# Patient Record
Sex: FEMALE | Race: WHITE | NOT HISPANIC OR LATINO | Employment: OTHER | URBAN - METROPOLITAN AREA
[De-identification: names, ages, dates, MRNs, and addresses within clinical notes are randomized per-mention and may not be internally consistent; named-entity substitution may affect disease eponyms.]

---

## 2017-09-15 ENCOUNTER — OFFICE VISIT (OUTPATIENT)
Dept: PRIMARY CARE CLINIC | Facility: CLINIC | Age: 82
End: 2017-09-15
Payer: MEDICARE

## 2017-09-15 VITALS
OXYGEN SATURATION: 99 % | BODY MASS INDEX: 24.99 KG/M2 | SYSTOLIC BLOOD PRESSURE: 201 MMHG | WEIGHT: 135.81 LBS | HEART RATE: 81 BPM | DIASTOLIC BLOOD PRESSURE: 75 MMHG | RESPIRATION RATE: 18 BRPM | HEIGHT: 62 IN | TEMPERATURE: 98 F

## 2017-09-15 DIAGNOSIS — E03.9 HYPOTHYROIDISM, UNSPECIFIED TYPE: ICD-10-CM

## 2017-09-15 DIAGNOSIS — Z23 NEED FOR IMMUNIZATION AGAINST INFLUENZA: Primary | ICD-10-CM

## 2017-09-15 DIAGNOSIS — I10 ESSENTIAL HYPERTENSION: ICD-10-CM

## 2017-09-15 DIAGNOSIS — E11.9 TYPE 2 DIABETES MELLITUS WITHOUT COMPLICATION, WITHOUT LONG-TERM CURRENT USE OF INSULIN: ICD-10-CM

## 2017-09-15 DIAGNOSIS — C18.9 MALIGNANT NEOPLASM OF COLON, UNSPECIFIED PART OF COLON: ICD-10-CM

## 2017-09-15 PROCEDURE — 1126F AMNT PAIN NOTED NONE PRSNT: CPT | Mod: S$GLB,,, | Performed by: INTERNAL MEDICINE

## 2017-09-15 PROCEDURE — 99213 OFFICE O/P EST LOW 20 MIN: CPT | Mod: 25,S$GLB,, | Performed by: INTERNAL MEDICINE

## 2017-09-15 PROCEDURE — 90662 IIV NO PRSV INCREASED AG IM: CPT | Mod: S$GLB,,, | Performed by: INTERNAL MEDICINE

## 2017-09-15 PROCEDURE — G0008 ADMIN INFLUENZA VIRUS VAC: HCPCS | Mod: S$GLB,,, | Performed by: INTERNAL MEDICINE

## 2017-09-15 PROCEDURE — 1159F MED LIST DOCD IN RCRD: CPT | Mod: S$GLB,,, | Performed by: INTERNAL MEDICINE

## 2017-09-15 RX ORDER — PRAVASTATIN SODIUM 40 MG/1
40 TABLET ORAL DAILY
COMMUNITY
End: 2018-06-26 | Stop reason: SDUPTHER

## 2017-09-15 RX ORDER — INDAPAMIDE 1.25 MG/1
2.5 TABLET ORAL DAILY
COMMUNITY
End: 2018-06-26 | Stop reason: SDUPTHER

## 2017-09-15 RX ORDER — INSULIN GLARGINE 100 [IU]/ML
20 INJECTION, SOLUTION SUBCUTANEOUS DAILY
COMMUNITY
End: 2017-12-11 | Stop reason: SDUPTHER

## 2017-09-15 RX ORDER — LEVOTHYROXINE SODIUM 50 UG/1
50 TABLET ORAL DAILY
COMMUNITY
End: 2018-07-02 | Stop reason: SDUPTHER

## 2017-09-15 RX ORDER — IRBESARTAN 300 MG/1
300 TABLET ORAL NIGHTLY
COMMUNITY
End: 2018-03-27 | Stop reason: SDUPTHER

## 2017-09-15 NOTE — PROGRESS NOTES
2 patient identifiers confirmed, High dose influenza 0.5ml injection administered to right deltoid, no bleeding present to site, bandaid applied, tolerated well.

## 2017-09-16 RX ORDER — AMLODIPINE BESYLATE 5 MG/1
5 TABLET ORAL DAILY
Qty: 90 TABLET | Refills: 3
Start: 2017-09-16 | End: 2018-06-26 | Stop reason: SDUPTHER

## 2017-09-16 NOTE — PROGRESS NOTES
Subjective:       Patient ID: Jesenia Rodriguez is a 87 y.o. female.    Chief Complaint: Medication Refill    HPI   Pt needs refill meds going to [facility] for f/u colon cancer will have labs there next week will bring reports and flu vaccine  Review of Systems    Objective:      Physical Exam   Constitutional: She is oriented to person, place, and time. She appears well-developed and well-nourished. No distress.   HENT:   Head: Normocephalic and atraumatic.   Right Ear: External ear normal.   Left Ear: External ear normal.   Nose: Nose normal.   Mouth/Throat: Oropharynx is clear and moist. No oropharyngeal exudate.   Eyes: Conjunctivae and EOM are normal. Pupils are equal, round, and reactive to light. Right eye exhibits no discharge. Left eye exhibits no discharge.   Neck: Normal range of motion. Neck supple. No thyromegaly present.   Cardiovascular: Normal rate, regular rhythm, normal heart sounds and intact distal pulses.  Exam reveals no gallop and no friction rub.    No murmur heard.  Pulmonary/Chest: Effort normal and breath sounds normal. No respiratory distress. She has no wheezes. She has no rales. She exhibits no tenderness.   Abdominal: Soft. Bowel sounds are normal. She exhibits no distension. There is no tenderness. There is no rebound and no guarding.   Musculoskeletal: Normal range of motion. She exhibits no edema, tenderness or deformity.   Lymphadenopathy:     She has no cervical adenopathy.   Neurological: She is alert and oriented to person, place, and time.   Skin: Skin is warm and dry. Capillary refill takes less than 2 seconds. No rash noted. No erythema.   Psychiatric: She has a normal mood and affect. Judgment and thought content normal.   Nursing note and vitals reviewed.      Assessment:       1. Need for immunization against influenza        Plan:       Need for immunization against influenza  -     Influenza - High Dose (65+) (PF) (IM)

## 2017-10-04 RX ORDER — ANASTROZOLE 1 MG/1
1 TABLET ORAL DAILY
Qty: 90 TABLET | Refills: 0 | Status: SHIPPED | OUTPATIENT
Start: 2017-10-04 | End: 2018-01-02

## 2017-10-30 ENCOUNTER — CLINICAL SUPPORT (OUTPATIENT)
Dept: PRIMARY CARE CLINIC | Facility: CLINIC | Age: 82
End: 2017-10-30
Payer: MEDICARE

## 2017-10-30 DIAGNOSIS — Z23 IMMUNIZATION DUE: Primary | ICD-10-CM

## 2017-10-30 PROCEDURE — 90736 HZV VACCINE LIVE SUBQ: CPT | Mod: PBBFAC,PN

## 2017-10-30 PROCEDURE — G0009 ADMIN PNEUMOCOCCAL VACCINE: HCPCS | Mod: PBBFAC,PN

## 2017-10-30 PROCEDURE — 99211 OFF/OP EST MAY X REQ PHY/QHP: CPT | Mod: PBBFAC,PN,25

## 2017-10-30 PROCEDURE — 99999 PR PBB SHADOW E&M-EST. PATIENT-LVL I: CPT | Mod: PBBFAC,,,

## 2017-10-30 PROCEDURE — 90732 PPSV23 VACC 2 YRS+ SUBQ/IM: CPT | Mod: PBBFAC,PN

## 2017-12-11 RX ORDER — INSULIN GLARGINE 100 [IU]/ML
20 INJECTION, SOLUTION SUBCUTANEOUS DAILY
Qty: 3 ML | Refills: 5 | Status: SHIPPED | OUTPATIENT
Start: 2017-12-11 | End: 2019-02-11 | Stop reason: SDUPTHER

## 2018-03-28 RX ORDER — IRBESARTAN 300 MG/1
TABLET ORAL
Qty: 90 TABLET | Refills: 0 | Status: SHIPPED | OUTPATIENT
Start: 2018-03-28 | End: 2018-06-26 | Stop reason: SDUPTHER

## 2018-06-26 DIAGNOSIS — I10 ESSENTIAL HYPERTENSION: ICD-10-CM

## 2018-06-26 RX ORDER — INDAPAMIDE 1.25 MG/1
2.5 TABLET ORAL DAILY
Qty: 180 TABLET | Refills: 3 | Status: SHIPPED | OUTPATIENT
Start: 2018-06-26 | End: 2019-05-22 | Stop reason: SDUPTHER

## 2018-06-26 RX ORDER — AMLODIPINE BESYLATE 5 MG/1
5 TABLET ORAL DAILY
Qty: 90 TABLET | Refills: 3 | Status: SHIPPED | OUTPATIENT
Start: 2018-06-26 | End: 2019-05-22 | Stop reason: SDUPTHER

## 2018-06-26 RX ORDER — IRBESARTAN 300 MG/1
300 TABLET ORAL DAILY
Qty: 90 TABLET | Refills: 3 | Status: SHIPPED | OUTPATIENT
Start: 2018-06-26 | End: 2019-05-22 | Stop reason: SDUPTHER

## 2018-06-26 RX ORDER — PRAVASTATIN SODIUM 40 MG/1
40 TABLET ORAL DAILY
Qty: 90 TABLET | Refills: 3 | Status: SHIPPED | OUTPATIENT
Start: 2018-06-26 | End: 2019-05-22 | Stop reason: SDUPTHER

## 2018-07-02 RX ORDER — LEVOTHYROXINE SODIUM 50 UG/1
50 TABLET ORAL DAILY
Qty: 90 TABLET | Refills: 3 | Status: SHIPPED | OUTPATIENT
Start: 2018-07-02 | End: 2019-05-22 | Stop reason: SDUPTHER

## 2018-07-02 NOTE — TELEPHONE ENCOUNTER
----- Message from sIidro Kovacs sent at 7/2/2018  3:11 PM CDT -----  Contact: Jesenia   Calling to get her Rx levothyroxine (SYNTHROID) 50 MCG tablet  She said it was supposed to be sent last week with her others. She is out of her medication at this point    [facility] [place] [facility] - [address] AT [facility] [address]  [address] [address]  Phone: [phone] Fax: [phone]

## 2018-10-16 ENCOUNTER — OFFICE VISIT (OUTPATIENT)
Dept: PRIMARY CARE CLINIC | Facility: CLINIC | Age: 83
End: 2018-10-16
Payer: MEDICARE

## 2018-10-16 VITALS
HEART RATE: 74 BPM | WEIGHT: 141.81 LBS | DIASTOLIC BLOOD PRESSURE: 74 MMHG | HEIGHT: 62 IN | TEMPERATURE: 98 F | SYSTOLIC BLOOD PRESSURE: 155 MMHG | OXYGEN SATURATION: 98 % | BODY MASS INDEX: 26.09 KG/M2 | RESPIRATION RATE: 18 BRPM

## 2018-10-16 DIAGNOSIS — E11.9 TYPE 2 DIABETES MELLITUS WITHOUT COMPLICATION, WITHOUT LONG-TERM CURRENT USE OF INSULIN: Primary | ICD-10-CM

## 2018-10-16 DIAGNOSIS — N18.2 CHRONIC RENAL IMPAIRMENT, STAGE 2 (MILD): ICD-10-CM

## 2018-10-16 DIAGNOSIS — I10 ESSENTIAL HYPERTENSION: ICD-10-CM

## 2018-10-16 DIAGNOSIS — C18.9 MALIGNANT NEOPLASM OF COLON, UNSPECIFIED PART OF COLON: ICD-10-CM

## 2018-10-16 DIAGNOSIS — E03.9 HYPOTHYROIDISM, UNSPECIFIED TYPE: ICD-10-CM

## 2018-10-16 PROCEDURE — 99999 PR PBB SHADOW E&M-EST. PATIENT-LVL III: CPT | Mod: PBBFAC,,, | Performed by: INTERNAL MEDICINE

## 2018-10-16 PROCEDURE — 99213 OFFICE O/P EST LOW 20 MIN: CPT | Mod: S$PBB,,, | Performed by: INTERNAL MEDICINE

## 2018-10-16 PROCEDURE — 81002 URINALYSIS NONAUTO W/O SCOPE: CPT | Mod: PBBFAC,PN | Performed by: INTERNAL MEDICINE

## 2018-10-16 PROCEDURE — 99213 OFFICE O/P EST LOW 20 MIN: CPT | Mod: PBBFAC,PN | Performed by: INTERNAL MEDICINE

## 2018-10-16 RX ORDER — ANASTROZOLE 1 MG/1
1 TABLET ORAL
COMMUNITY
Start: 2018-04-10 | End: 2019-05-22 | Stop reason: SDUPTHER

## 2018-10-16 RX ORDER — VIT C/E/ZN/COPPR/LUTEIN/ZEAXAN 250MG-90MG
1000 CAPSULE ORAL
COMMUNITY
End: 2019-05-22

## 2018-10-16 RX ORDER — PEN NEEDLE, DIABETIC 30 GX3/16"
NEEDLE, DISPOSABLE MISCELLANEOUS
COMMUNITY
Start: 2016-07-29 | End: 2019-05-22 | Stop reason: SDUPTHER

## 2018-10-17 LAB
BILIRUB SERPL-MCNC: NORMAL MG/DL
BLOOD URINE, POC: NORMAL
COLOR, POC UA: YELLOW
GLUCOSE UR QL STRIP: NORMAL
KETONES UR QL STRIP: NORMAL
LEUKOCYTE ESTERASE URINE, POC: NORMAL
NITRITE, POC UA: NORMAL
PH, POC UA: 6
PROTEIN, POC: NORMAL
SPECIFIC GRAVITY, POC UA: 1.01
UROBILINOGEN, POC UA: NORMAL

## 2018-10-17 NOTE — PROGRESS NOTES
Subjective:       Patient ID: Jesenia Rodriguez is a 88 y.o. female.    Chief Complaint: Annual Exam    HPI   patient came back from [facility] for follow-up on her colon cancer was on chemotherapy but discontinue toe patient N/C and her kidney is not normal and need to drink more water base and does not have a copy of any blood tests or any kind of medical record to take back with but clinically if see feel fine no short of breath chest pain no increasing fluid in her legs seems to have good normal urine output  Review of Systems    Objective:      Physical Exam   Constitutional: She is oriented to person, place, and time. She appears well-developed and well-nourished. No distress.   HENT:   Head: Normocephalic and atraumatic.   Right Ear: External ear normal.   Left Ear: External ear normal.   Nose: Nose normal.   Mouth/Throat: Oropharynx is clear and moist. No oropharyngeal exudate.   Eyes: Conjunctivae and EOM are normal. Pupils are equal, round, and reactive to light. Right eye exhibits no discharge. Left eye exhibits no discharge.   Neck: Normal range of motion. Neck supple. No thyromegaly present.   Cardiovascular: Normal rate, regular rhythm, normal heart sounds and intact distal pulses. Exam reveals no gallop and no friction rub.   No murmur heard.  Pulmonary/Chest: Effort normal and breath sounds normal. No respiratory distress. She has no wheezes. She has no rales. She exhibits no tenderness.   Abdominal: Soft. Bowel sounds are normal. She exhibits no distension. There is no tenderness. There is no rebound and no guarding.   Musculoskeletal: Normal range of motion. She exhibits no edema, tenderness or deformity.   Lymphadenopathy:     She has no cervical adenopathy.   Neurological: She is alert and oriented to person, place, and time.   Skin: Skin is warm and dry. Capillary refill takes less than 2 seconds. No rash noted. No erythema.   Psychiatric: She has a normal mood and affect. Judgment and thought  content normal.   Nursing note and vitals reviewed.      Assessment:       1. Type 2 diabetes mellitus without complication, without long-term current use of insulin    2. Malignant neoplasm of colon, unspecified part of colon    3. Essential hypertension    4. Hypothyroidism, unspecified type    5. Chronic renal impairment, stage 2 (mild)        Plan:       Type 2 diabetes mellitus without complication, without long-term current use of insulin  -     Microalbumin/creatinine urine ratio; Future; Expected date: 10/16/2018  -     Hemoglobin A1c; Future; Expected date: 10/16/2018    Malignant neoplasm of colon, unspecified part of colon  Comments:  f/u with [facility]    Essential hypertension  -     CBC auto differential; Future; Expected date: 10/16/2018  -     Comprehensive metabolic panel; Future; Expected date: 10/16/2018  -     Lipid panel; Future; Expected date: 10/16/2018  -     POCT URINE DIPSTICK WITHOUT MICROSCOPE    Hypothyroidism, unspecified type  -     TSH; Future; Expected date: 10/16/2018  -     T4, free; Future; Expected date: 10/16/2018    Chronic renal impairment, stage 2 (mild)  -     Comprehensive metabolic panel; Future; Expected date: 10/16/2018

## 2018-10-22 ENCOUNTER — TELEPHONE (OUTPATIENT)
Dept: PRIMARY CARE CLINIC | Facility: CLINIC | Age: 83
End: 2018-10-22

## 2018-10-22 NOTE — TELEPHONE ENCOUNTER
----- Message from Ella Sidhu sent at 10/22/2018  2:52 PM CDT -----  Contact: Self  Type:  Test Results    Who Called:  Patient  Name of Test (Lab/Mammo/Etc):  lab  Date of Test:  1017  Ordering Provider: Dr Dallas  Where the test was performed:  [facility]  Best Call Back Number:  [phone]   Additional Information: na

## 2019-02-04 ENCOUNTER — OFFICE VISIT (OUTPATIENT)
Dept: PRIMARY CARE CLINIC | Facility: CLINIC | Age: 84
End: 2019-02-04
Payer: MEDICARE

## 2019-02-04 VITALS
HEART RATE: 85 BPM | SYSTOLIC BLOOD PRESSURE: 134 MMHG | WEIGHT: 138.13 LBS | RESPIRATION RATE: 18 BRPM | OXYGEN SATURATION: 98 % | HEIGHT: 62 IN | DIASTOLIC BLOOD PRESSURE: 75 MMHG | BODY MASS INDEX: 25.42 KG/M2 | TEMPERATURE: 98 F

## 2019-02-04 DIAGNOSIS — E03.9 HYPOTHYROIDISM, UNSPECIFIED TYPE: ICD-10-CM

## 2019-02-04 DIAGNOSIS — E11.9 TYPE 2 DIABETES MELLITUS WITHOUT COMPLICATION, WITHOUT LONG-TERM CURRENT USE OF INSULIN: Primary | ICD-10-CM

## 2019-02-04 DIAGNOSIS — N18.2 CHRONIC RENAL IMPAIRMENT, STAGE 2 (MILD): ICD-10-CM

## 2019-02-04 DIAGNOSIS — C18.9 MALIGNANT NEOPLASM OF COLON, UNSPECIFIED PART OF COLON: ICD-10-CM

## 2019-02-04 DIAGNOSIS — I10 ESSENTIAL HYPERTENSION: ICD-10-CM

## 2019-02-04 PROCEDURE — 99213 OFFICE O/P EST LOW 20 MIN: CPT | Mod: S$PBB,,, | Performed by: INTERNAL MEDICINE

## 2019-02-04 PROCEDURE — 99999 PR PBB SHADOW E&M-EST. PATIENT-LVL III: CPT | Mod: PBBFAC,,, | Performed by: INTERNAL MEDICINE

## 2019-02-04 PROCEDURE — 99999 PR PBB SHADOW E&M-EST. PATIENT-LVL III: ICD-10-PCS | Mod: PBBFAC,,, | Performed by: INTERNAL MEDICINE

## 2019-02-04 PROCEDURE — 99213 OFFICE O/P EST LOW 20 MIN: CPT | Mod: PBBFAC,PN | Performed by: INTERNAL MEDICINE

## 2019-02-04 PROCEDURE — 99213 PR OFFICE/OUTPT VISIT, EST, LEVL III, 20-29 MIN: ICD-10-PCS | Mod: S$PBB,,, | Performed by: INTERNAL MEDICINE

## 2019-02-04 NOTE — PROGRESS NOTES
Subjective:       Patient ID: Jesenia Rodriguez is a 88 y.o. female.    Chief Complaint: Follow-up and Medication Refill    HPI  patient clinically doing well no physical symptoms she had breast cancer on the right  20 years ago resolved with mastectomy and left breast cancer 5 years ago resolved with left mastectomy colon cancer the 5 years ago resolved with the partial colon resection patient had flu vaccine and CVA at with the shingle vaccine in November of last year  Review of Systems    Objective:      Physical Exam   Constitutional: She is oriented to person, place, and time. She appears well-developed and well-nourished. No distress.   HENT:   Head: Normocephalic and atraumatic.   Right Ear: External ear normal.   Left Ear: External ear normal.   Nose: Nose normal.   Mouth/Throat: Oropharynx is clear and moist. No oropharyngeal exudate.   Eyes: Conjunctivae and EOM are normal. Pupils are equal, round, and reactive to light. Right eye exhibits no discharge. Left eye exhibits no discharge.   Neck: Normal range of motion. Neck supple. No thyromegaly present.   Cardiovascular: Normal rate, regular rhythm, normal heart sounds and intact distal pulses. Exam reveals no gallop and no friction rub.   No murmur heard.  Pulmonary/Chest: Effort normal and breath sounds normal. No respiratory distress. She has no wheezes. She has no rales. She exhibits no tenderness.   Abdominal: Soft. Bowel sounds are normal. She exhibits no distension. There is no tenderness. There is no rebound and no guarding.   Musculoskeletal: Normal range of motion. She exhibits no edema, tenderness or deformity.   Lymphadenopathy:     She has no cervical adenopathy.   Neurological: She is alert and oriented to person, place, and time.   Skin: Skin is warm and dry. Capillary refill takes less than 2 seconds. No rash noted. No erythema.   Status post bilateral mastectomy   Psychiatric: She has a normal mood and affect. Judgment and thought content  normal.   Nursing note and vitals reviewed.      Assessment:       1. Type 2 diabetes mellitus without complication, without long-term current use of insulin    2. Essential hypertension    3. Malignant neoplasm of colon, unspecified part of colon    4. Chronic renal impairment, stage 2 (mild)    5. Hypothyroidism, unspecified type        Plan:       Type 2 diabetes mellitus without complication, without long-term current use of insulin  Comments:  Patient will get blood test with next visit last hemoglobin A1c 5 months ago 6.8  Orders:  -     Microalbumin/creatinine urine ratio; Future; Expected date: 02/04/2019  -     Hemoglobin A1c; Future; Expected date: 02/04/2019    Essential hypertension  Comments:  Stable on medication  Orders:  -     CBC auto differential; Future; Expected date: 02/04/2019  -     Comprehensive metabolic panel; Future; Expected date: 02/04/2019  -     Lipid panel; Future; Expected date: 02/04/2019  -     POCT URINE DIPSTICK WITHOUT MICROSCOPE    Malignant neoplasm of colon, unspecified part of colon  Comments:  No relapse or recurrence of status post partial colon resection    Chronic renal impairment, stage 2 (mild)  Comments:  Stable blood test next visit  Orders:  -     Comprehensive metabolic panel; Future; Expected date: 02/04/2019    Hypothyroidism, unspecified type  -     T4, free; Future; Expected date: 02/04/2019  -     TSH; Future; Expected date: 02/04/2019

## 2019-02-11 RX ORDER — INSULIN GLARGINE 100 [IU]/ML
20 INJECTION, SOLUTION SUBCUTANEOUS DAILY
Qty: 3 ML | Refills: 5 | Status: SHIPPED | OUTPATIENT
Start: 2019-02-11 | End: 2019-05-17 | Stop reason: SDUPTHER

## 2019-02-11 NOTE — TELEPHONE ENCOUNTER
----- Message from Shasta Hope sent at 2/11/2019  4:07 PM CST -----  Contact: [phone]  Patient requesting a refill on lantus solostar.    Patient will be using   [facility] [address] [place] [address] [address] [address]  [address] AT [facility] [address]  [address] [address]  Phone: [phone] Fax: [phone]    Please call patient at [phone].     Thanks!

## 2019-05-17 NOTE — TELEPHONE ENCOUNTER
----- Message from Isabelle Dove sent at 5/17/2019 12:40 PM CDT -----  Pt needs a [facility]ill for [facility]

## 2019-05-19 RX ORDER — INSULIN GLARGINE 100 [IU]/ML
20 INJECTION, SOLUTION SUBCUTANEOUS DAILY
Qty: 3 ML | Refills: 5 | Status: SHIPPED | OUTPATIENT
Start: 2019-05-19 | End: 2019-05-22 | Stop reason: SDUPTHER

## 2019-05-22 ENCOUNTER — OFFICE VISIT (OUTPATIENT)
Dept: PRIMARY CARE CLINIC | Facility: CLINIC | Age: 84
End: 2019-05-22
Payer: MEDICARE

## 2019-05-22 VITALS
HEIGHT: 62 IN | WEIGHT: 137.19 LBS | BODY MASS INDEX: 25.25 KG/M2 | TEMPERATURE: 99 F | DIASTOLIC BLOOD PRESSURE: 87 MMHG | OXYGEN SATURATION: 98 % | SYSTOLIC BLOOD PRESSURE: 128 MMHG | RESPIRATION RATE: 18 BRPM | HEART RATE: 86 BPM

## 2019-05-22 DIAGNOSIS — Z13.6 ENCOUNTER FOR SCREENING FOR CARDIOVASCULAR DISORDERS: ICD-10-CM

## 2019-05-22 DIAGNOSIS — M89.9 DISEASE OF BONE: ICD-10-CM

## 2019-05-22 DIAGNOSIS — I10 ESSENTIAL HYPERTENSION: ICD-10-CM

## 2019-05-22 DIAGNOSIS — Q66.221 METATARSUS ADDUCTUS OF RIGHT FOOT: ICD-10-CM

## 2019-05-22 DIAGNOSIS — E11.9 TYPE 2 DIABETES MELLITUS WITHOUT COMPLICATION, WITHOUT LONG-TERM CURRENT USE OF INSULIN: Primary | ICD-10-CM

## 2019-05-22 DIAGNOSIS — C50.919 MALIGNANT NEOPLASM OF FEMALE BREAST, UNSPECIFIED ESTROGEN RECEPTOR STATUS, UNSPECIFIED LATERALITY, UNSPECIFIED SITE OF BREAST: ICD-10-CM

## 2019-05-22 DIAGNOSIS — N18.2 CHRONIC RENAL IMPAIRMENT, STAGE 2 (MILD): ICD-10-CM

## 2019-05-22 DIAGNOSIS — E11.9 ENCOUNTER FOR DIABETIC FOOT EXAM: ICD-10-CM

## 2019-05-22 DIAGNOSIS — E03.9 HYPOTHYROIDISM, UNSPECIFIED TYPE: ICD-10-CM

## 2019-05-22 DIAGNOSIS — Q66.222 METATARSUS ADDUCTUS OF LEFT FOOT: ICD-10-CM

## 2019-05-22 PROCEDURE — 99999 PR PBB SHADOW E&M-EST. PATIENT-LVL IV: CPT | Mod: PBBFAC,,, | Performed by: INTERNAL MEDICINE

## 2019-05-22 PROCEDURE — 99214 PR OFFICE/OUTPT VISIT, EST, LEVL IV, 30-39 MIN: ICD-10-PCS | Mod: S$PBB,,, | Performed by: INTERNAL MEDICINE

## 2019-05-22 PROCEDURE — 99214 OFFICE O/P EST MOD 30 MIN: CPT | Mod: PBBFAC,PN | Performed by: INTERNAL MEDICINE

## 2019-05-22 PROCEDURE — 99999 PR PBB SHADOW E&M-EST. PATIENT-LVL IV: ICD-10-PCS | Mod: PBBFAC,,, | Performed by: INTERNAL MEDICINE

## 2019-05-22 PROCEDURE — 99214 OFFICE O/P EST MOD 30 MIN: CPT | Mod: S$PBB,,, | Performed by: INTERNAL MEDICINE

## 2019-05-22 RX ORDER — LEVOTHYROXINE SODIUM 50 UG/1
50 TABLET ORAL DAILY
Qty: 90 TABLET | Refills: 3 | Status: SHIPPED | OUTPATIENT
Start: 2019-05-22 | End: 2020-06-16 | Stop reason: SDUPTHER

## 2019-05-22 RX ORDER — AMLODIPINE BESYLATE 5 MG/1
5 TABLET ORAL DAILY
Qty: 90 TABLET | Refills: 3 | Status: SHIPPED | OUTPATIENT
Start: 2019-05-22 | End: 2020-06-16 | Stop reason: SDUPTHER

## 2019-05-22 RX ORDER — INDAPAMIDE 1.25 MG/1
2.5 TABLET ORAL DAILY
Qty: 180 TABLET | Refills: 3 | Status: SHIPPED | OUTPATIENT
Start: 2019-05-22 | End: 2020-06-16 | Stop reason: SDUPTHER

## 2019-05-22 RX ORDER — IRBESARTAN 300 MG/1
300 TABLET ORAL DAILY
Qty: 90 TABLET | Refills: 3 | Status: SHIPPED | OUTPATIENT
Start: 2019-05-22 | End: 2020-06-16 | Stop reason: SDUPTHER

## 2019-05-22 RX ORDER — PEN NEEDLE, DIABETIC 30 GX3/16"
NEEDLE, DISPOSABLE MISCELLANEOUS
Qty: 100 EACH | Refills: 3 | Status: SHIPPED | OUTPATIENT
Start: 2019-05-22

## 2019-05-22 RX ORDER — ANASTROZOLE 1 MG/1
1 TABLET ORAL DAILY
Qty: 90 TABLET | Refills: 1 | Status: SHIPPED | OUTPATIENT
Start: 2019-05-22

## 2019-05-22 RX ORDER — INSULIN GLARGINE 100 [IU]/ML
20 INJECTION, SOLUTION SUBCUTANEOUS DAILY
Qty: 3 ML | Refills: 5 | Status: SHIPPED | OUTPATIENT
Start: 2019-05-22 | End: 2019-06-03 | Stop reason: SDUPTHER

## 2019-05-22 RX ORDER — PRAVASTATIN SODIUM 40 MG/1
40 TABLET ORAL DAILY
Qty: 90 TABLET | Refills: 3 | Status: SHIPPED | OUTPATIENT
Start: 2019-05-22 | End: 2020-06-16 | Stop reason: SDUPTHER

## 2019-05-22 NOTE — PROGRESS NOTES
Subjective:       Patient ID: Jesenia Rodriguez is a 89 y.o. female.    Chief Complaint: Annual Exam    HPI patient is here for annual exam mainly to refill her medication her daughter just diet recently the last 3 weeks after being sick with several trips to the hospital her daughter decided to go with hospice and passed away at home patient clinically doing well no physical complain upset because of her daughter's death but understand seen is was from multiple and stage organ failure from medical illnesses patient denies short of breath chest pain dyspnea with exertion deny headache insomnia patient granddaughter stays with her now and she was thinking moving to [place] with her son and also close to [facility] where she had the colon cancer and breast cancer treated patient not ready for any blood tests mammogram or any testing procedure at the present time will come back when she is more settled down at home  Review of Systems    Objective:      Physical Exam   Constitutional: She is oriented to person, place, and time. She appears well-developed and well-nourished. No distress.   HENT:   Head: Normocephalic and atraumatic.   Right Ear: External ear normal.   Left Ear: External ear normal.   Nose: Nose normal.   Mouth/Throat: Oropharynx is clear and moist. No oropharyngeal exudate.   Eyes: Pupils are equal, round, and reactive to light. Conjunctivae and EOM are normal. Right eye exhibits no discharge. Left eye exhibits no discharge.   Neck: Normal range of motion. Neck supple. No thyromegaly present.   Cardiovascular: Normal rate, regular rhythm, normal heart sounds and intact distal pulses. Exam reveals no gallop and no friction rub.   No murmur heard.  Pulses:       Dorsalis pedis pulses are 2+ on the right side, and 2+ on the left side.        Posterior tibial pulses are 2+ on the right side, and 2+ on the left side.   Pulmonary/Chest: Effort normal and breath sounds normal. No respiratory distress. She has no  wheezes. She has no rales. She exhibits no tenderness.   Abdominal: Soft. Bowel sounds are normal. She exhibits no distension. There is no tenderness. There is no rebound and no guarding.   Musculoskeletal: Normal range of motion. She exhibits no edema or tenderness.        Right foot: There is deformity (Deviation of the 1st toe laterally). There is normal range of motion.        Left foot: There is deformity (Deviation of the 1st big toe laterally). There is normal range of motion.   Feet:   Right Foot:   Protective Sensation: 3 sites tested. 3 sites sensed.   Skin Integrity: Negative for ulcer, blister, skin breakdown or callus.   Left Foot:   Protective Sensation: 4 sites tested. 4 sites sensed.   Skin Integrity: Negative for ulcer, blister, skin breakdown or callus.   Lymphadenopathy:     She has no cervical adenopathy.   Neurological: She is alert and oriented to person, place, and time.   Skin: Skin is warm and dry. Capillary refill takes less than 2 seconds. No rash noted. No erythema.   Psychiatric: She has a normal mood and affect. Judgment and thought content normal.   Nursing note and vitals reviewed.      Assessment:       1. Type 2 diabetes mellitus without complication, without long-term current use of insulin    2. Essential hypertension    3. Encounter for screening for cardiovascular disorders    4. Chronic renal impairment, stage 2 (mild)    5. Hypothyroidism, unspecified type    6. Malignant neoplasm of female breast, unspecified estrogen receptor status, unspecified laterality, unspecified site of breast    7. Encounter for diabetic foot exam    8. Metatarsus adductus of right foot    9. Metatarsus adductus of left foot    10. Disease of bone        Plan:       Type 2 diabetes mellitus without complication, without long-term current use of insulin  -     blood sugar diagnostic (ONETOUCH ULTRA BLUE TEST STRIP) Strp; TEST ONCE D  Dispense: 100 strip; Refill: 3  -     pen needle, diabetic (BD  "ULTRA-FINE MINI PEN NEEDLE) 31 gauge x 3/16" Ndle; USE AS DIRECTED  Dispense: 100 each; Refill: 3  -     Microalbumin/creatinine urine ratio; Future; Expected date: 05/22/2019  -     Hemoglobin A1c; Future; Expected date: 05/22/2019    Essential hypertension  Comments:  not well controlled add amlodipine  Orders:  -     amLODIPine (NORVASC) 5 MG tablet; Take 1 tablet (5 mg total) by mouth once daily.  Dispense: 90 tablet; Refill: 3  -     anastrozole (ARIMIDEX) 1 mg Tab; Take 1 tablet (1 mg total) by mouth once daily.  Dispense: 90 tablet; Refill: 1  -     indapamide (LOZOL) 1.25 MG Tab; Take 2 tablets (2.5 mg total) by mouth once daily.  Dispense: 180 tablet; Refill: 3  -     insulin (LANTUS SOLOSTAR U-100 INSULIN) glargine 100 units/mL (3mL) SubQ pen; Inject 20 Units into the skin once daily.  Dispense: 3 mL; Refill: 5  -     irbesartan (AVAPRO) 300 MG tablet; Take 1 tablet (300 mg total) by mouth once daily.  Dispense: 90 tablet; Refill: 3  -     levothyroxine (SYNTHROID) 50 MCG tablet; Take 1 tablet (50 mcg total) by mouth once daily.  Dispense: 90 tablet; Refill: 3  -     pravastatin (PRAVACHOL) 40 MG tablet; Take 1 tablet (40 mg total) by mouth once daily.  Dispense: 90 tablet; Refill: 3  -     CBC auto differential; Future; Expected date: 05/22/2019  -     Comprehensive metabolic panel; Future; Expected date: 05/22/2019  -     X-Ray Chest PA And Lateral; Future; Expected date: 05/22/2019  -     POCT URINE DIPSTICK WITHOUT MICROSCOPE    Encounter for screening for cardiovascular disorders  -     Lipid panel; Future; Expected date: 05/22/2019    Chronic renal impairment, stage 2 (mild)    Hypothyroidism, unspecified type  -     T4, free; Future; Expected date: 05/22/2019  -     TSH; Future; Expected date: 05/22/2019    Malignant neoplasm of female breast, unspecified estrogen receptor status, unspecified laterality, unspecified site of breast    Encounter for diabetic foot exam  Comments:  Bilateral adductus " hallus podiatrist consult when patient want to currently not causing pain    Metatarsus adductus of right foot    Metatarsus adductus of left foot    Disease of bone  -     Vitamin D; Future; Expected date: 05/22/2019

## 2019-06-03 DIAGNOSIS — I10 ESSENTIAL HYPERTENSION: ICD-10-CM

## 2019-06-03 RX ORDER — INSULIN GLARGINE 100 [IU]/ML
20 INJECTION, SOLUTION SUBCUTANEOUS DAILY
Qty: 3 ML | Refills: 5 | Status: SHIPPED | OUTPATIENT
Start: 2019-06-03 | End: 2020-03-06 | Stop reason: SDUPTHER

## 2019-06-03 NOTE — TELEPHONE ENCOUNTER
----- Message from Stephanie Bradshaw sent at 6/3/2019 11:10 AM CDT -----  Contact: patient  Type: Needs Medical Advice    Who Called:  patient  Symptoms (please be specific):    How long has patient had these symptoms:    Pharmacy name and phone #:    Best Call Back Number: [phone]  Additional Information: requesting a call back regarding script for insulin,stated that she left a previous message. Stated that someone was to contact [facility] regarding this

## 2019-06-03 NOTE — TELEPHONE ENCOUNTER
----- Message from Elvira Orellana sent at 6/3/2019  1:32 PM CDT -----  Type: Needs Medical Advice    Who Called:  Granddaughter (Susana)  Best Call Back Number: [phone]  Additional Information: Calling back concerning patient's insulin/patient has been without and due for another dose at 4:00/please call back to advise.

## 2019-06-03 NOTE — TELEPHONE ENCOUNTER
----- Message from Danni Marie sent at 6/3/2019  9:25 AM CDT -----  Contact: Patient  Type: Needs Medical Advice    Who Called:  Jesenia patient  Symptoms (please be specific):  N/A  How long has patient had these symptoms:  N/A  Pharmacy name and phone #:    [id number] - [place] - [address] AT [facility] OF [address]  [address] [address]  Phone: [phone] Fax: [phone]  Best Call Back Number: [phone]  Additional Information: Calling because [facility] is telling the patient she has no refills on Rx insulin (LANTUS SOLOSTAR U-100 INSULIN) glargine 100 units/mL (3mL) SubQ pen. Please advise. Thanks.

## 2020-03-06 DIAGNOSIS — I10 ESSENTIAL HYPERTENSION: ICD-10-CM

## 2020-03-06 RX ORDER — INSULIN GLARGINE 100 [IU]/ML
20 INJECTION, SOLUTION SUBCUTANEOUS DAILY
Qty: 3 ML | Refills: 5 | Status: SHIPPED | OUTPATIENT
Start: 2020-03-06 | End: 2020-06-08 | Stop reason: SDUPTHER

## 2020-03-06 NOTE — TELEPHONE ENCOUNTER
----- Message from Danni Salazar sent at 3/6/2020 12:33 PM CST -----  Contact: Son  Type:  RX Refill Request    Who Called:  Lyle vanessa  Refill or New Rx:  Refill  RX Name and Strength:  insulin (LANTUS SOLOSTAR U-100 INSULIN) glargine 100 units/mL (3mL) SubQ pen  How is the patient currently taking it? (ex. 1XDay):  Inject 20 Units into the skin once daily  Is this a 30 day or 90 day RX:  ?  Preferred Pharmacy with phone number:    [facility]   [place]  Phone [phone]  Local or Mail Order:  Local  Ordering Provider:  Evelin Estrada Call Back Number:  [phone]  Additional Information:  Please note change of pharmacy. Please advise. Thanks.

## 2020-06-08 DIAGNOSIS — I10 ESSENTIAL HYPERTENSION: ICD-10-CM

## 2020-06-08 NOTE — TELEPHONE ENCOUNTER
----- Message from Danni Salazar sent at 6/8/2020  9:55 AM CDT -----  Contact: Son  Type: Needs Medical Advice    Who Called:  Lyle patient  Symptoms (please be specific):  N/A  How long has patient had these symptoms:  N/A  [facility]arm[facility] and [facility] #[email or web address]   [address] [place]  Phone [phone]  Best Call Back Number: [phone]  Additional Information: Calling because the pharmacy is telling him there are no refills on Rx insulin (LANTUS SOLOSTAR U-100 INSULIN) glargine 100 units/mL (3mL) SubQ pen. Please advise. Thanks.

## 2020-06-09 RX ORDER — INSULIN GLARGINE 100 [IU]/ML
20 INJECTION, SOLUTION SUBCUTANEOUS DAILY
Qty: 3 ML | Refills: 5 | Status: SHIPPED | OUTPATIENT
Start: 2020-06-09

## 2020-06-16 DIAGNOSIS — I10 ESSENTIAL HYPERTENSION: ICD-10-CM

## 2020-06-16 NOTE — TELEPHONE ENCOUNTER
----- Message from Danni Marie sent at 6/16/2020  9:42 AM CDT -----  Contact: Son  Type:  RX Refill Request    Who Called: Lyle, patient  Refill or New Rx:  Refill  RX Name and Strength:  pravastatin (PRAVACHOL) 40 MG tablet  How is the patient currently taking it? (ex. 1XDay):  Take 1 tablet (40 mg total) by mouth once daily  Is this a 30 day or 90 day RX:  30  Preferred Pharmacy with phone number:    [facility]   [place], [place]  Phone [phone]  Local or Mail Order:  Local  Ordering Provider:  Dr Dallas  Would the patient rather a call back or a response via [facility]?   Phone call  Best Call Back Number:  [phone]  Additional Information: Can she have one more month to last her until her son can find her a provider in [place], she has moved there with him. Please see second request.      Type:  RX Refill Request    Refill or New Rx:  Refill  RX Name and Strength:  amLODIPine (NORVASC) 5 MG tablet  How is the patient currently taking it? (ex. 1XDay):  Take 1 tablet (5 mg total) by mouth once daily  Is this a 30 day or 90 day RX:  30  Additional Information:  Please see third request.      Type:  RX Refill Request    Refill or New Rx:  Refill  RX Name and Strength:  irbesartan (AVAPRO) 300 MG tabl  How is the patient currently taking it? (ex. 1XDay):  Take 1 tablet (300 mg total) by mouth once daily  Is this a 30 day or 90 day RX:  30  Additional Information:  Please see fourth request.    \  Type:  RX Refill Request    Refill or New Rx:  Refill  RX Name and Strength:  levothyroxine (SYNTHROID) 50 MCG tablet  How is the patient currently taking it? (ex. 1XDay):  Take 1 tablet (50 mcg total) by mouth once daily  Is this a 30 day or 90 day RX:  30  Additional Information:  Please see fifth request.      Type:  RX Refill Request    Refill or New Rx:  Refill  RX Name and Strength:  indapamide (LOZOL) 1.25 MG Tab  How is the patient currently taking it? (ex. 1XDay):   Take 2 tablets (2.5 mg total) by mouth once  daily  Is this a 30 day or 90 day RX:  30  Additional Information:  Please advise. Thanks.

## 2020-06-17 RX ORDER — PRAVASTATIN SODIUM 40 MG/1
40 TABLET ORAL DAILY
Qty: 90 TABLET | Refills: 0 | Status: SHIPPED | OUTPATIENT
Start: 2020-06-17

## 2020-06-17 RX ORDER — IRBESARTAN 300 MG/1
300 TABLET ORAL DAILY
Qty: 90 TABLET | Refills: 0 | Status: SHIPPED | OUTPATIENT
Start: 2020-06-17

## 2020-06-17 RX ORDER — INDAPAMIDE 1.25 MG/1
2.5 TABLET ORAL DAILY
Qty: 180 TABLET | Refills: 0 | Status: SHIPPED | OUTPATIENT
Start: 2020-06-17

## 2020-06-17 RX ORDER — LEVOTHYROXINE SODIUM 50 UG/1
50 TABLET ORAL DAILY
Qty: 90 TABLET | Refills: 0 | Status: SHIPPED | OUTPATIENT
Start: 2020-06-17

## 2020-06-17 RX ORDER — AMLODIPINE BESYLATE 5 MG/1
5 TABLET ORAL DAILY
Qty: 90 TABLET | Refills: 0 | Status: SHIPPED | OUTPATIENT
Start: 2020-06-17 | End: 2020-09-15

## 2020-06-17 NOTE — TELEPHONE ENCOUNTER
Tried calling pt. No answer - phone continued to ring then gave me a busy signal. Pt. Needs follow up apt. Last seen in may 2019

## 2020-07-15 DIAGNOSIS — Z71.89 COMPLEX CARE COORDINATION: ICD-10-CM
